# Patient Record
Sex: FEMALE | Race: OTHER
[De-identification: names, ages, dates, MRNs, and addresses within clinical notes are randomized per-mention and may not be internally consistent; named-entity substitution may affect disease eponyms.]

---

## 2018-03-18 ENCOUNTER — HOSPITAL ENCOUNTER (EMERGENCY)
Dept: HOSPITAL 62 - ER | Age: 23
LOS: 1 days | Discharge: HOME | End: 2018-03-19
Payer: SELF-PAY

## 2018-03-18 DIAGNOSIS — R11.10: Primary | ICD-10-CM

## 2018-03-18 DIAGNOSIS — Z90.49: ICD-10-CM

## 2018-03-18 DIAGNOSIS — R10.13: ICD-10-CM

## 2018-03-18 DIAGNOSIS — R10.11: ICD-10-CM

## 2018-03-18 PROCEDURE — 36415 COLL VENOUS BLD VENIPUNCTURE: CPT

## 2018-03-18 PROCEDURE — 96365 THER/PROPH/DIAG IV INF INIT: CPT

## 2018-03-18 PROCEDURE — 84703 CHORIONIC GONADOTROPIN ASSAY: CPT

## 2018-03-18 PROCEDURE — 80053 COMPREHEN METABOLIC PANEL: CPT

## 2018-03-18 PROCEDURE — 99284 EMERGENCY DEPT VISIT MOD MDM: CPT

## 2018-03-18 PROCEDURE — 96376 TX/PRO/DX INJ SAME DRUG ADON: CPT

## 2018-03-18 PROCEDURE — 87088 URINE BACTERIA CULTURE: CPT

## 2018-03-18 PROCEDURE — 74177 CT ABD & PELVIS W/CONTRAST: CPT

## 2018-03-18 PROCEDURE — 96361 HYDRATE IV INFUSION ADD-ON: CPT

## 2018-03-18 PROCEDURE — 87086 URINE CULTURE/COLONY COUNT: CPT

## 2018-03-18 PROCEDURE — 76705 ECHO EXAM OF ABDOMEN: CPT

## 2018-03-18 PROCEDURE — 96375 TX/PRO/DX INJ NEW DRUG ADDON: CPT

## 2018-03-18 PROCEDURE — 87186 SC STD MICRODIL/AGAR DIL: CPT

## 2018-03-18 PROCEDURE — 85025 COMPLETE CBC W/AUTO DIFF WBC: CPT

## 2018-03-18 PROCEDURE — 81001 URINALYSIS AUTO W/SCOPE: CPT

## 2018-03-18 PROCEDURE — 83690 ASSAY OF LIPASE: CPT

## 2018-03-19 VITALS — DIASTOLIC BLOOD PRESSURE: 75 MMHG | SYSTOLIC BLOOD PRESSURE: 121 MMHG

## 2018-03-19 LAB
ADD MANUAL DIFF: NO
ALBUMIN SERPL-MCNC: 4.1 G/DL (ref 3.5–5)
ALP SERPL-CCNC: 85 U/L (ref 38–126)
ALT SERPL-CCNC: 27 U/L (ref 9–52)
ANION GAP SERPL CALC-SCNC: 11 MMOL/L (ref 5–19)
APPEARANCE UR: (no result)
APTT PPP: (no result) S
AST SERPL-CCNC: 15 U/L (ref 14–36)
BASOPHILS # BLD AUTO: 0 10^3/UL (ref 0–0.2)
BASOPHILS NFR BLD AUTO: 0.3 % (ref 0–2)
BILIRUB DIRECT SERPL-MCNC: 0.3 MG/DL (ref 0–0.4)
BILIRUB SERPL-MCNC: 0.3 MG/DL (ref 0.2–1.3)
BILIRUB UR QL STRIP: (no result)
BUN SERPL-MCNC: 10 MG/DL (ref 7–20)
CALCIUM: 9.6 MG/DL (ref 8.4–10.2)
CHLORIDE SERPL-SCNC: 109 MMOL/L (ref 98–107)
CO2 SERPL-SCNC: 24 MMOL/L (ref 22–30)
EOSINOPHIL # BLD AUTO: 0 10^3/UL (ref 0–0.6)
EOSINOPHIL NFR BLD AUTO: 0.2 % (ref 0–6)
ERYTHROCYTE [DISTWIDTH] IN BLOOD BY AUTOMATED COUNT: 12.6 % (ref 11.5–14)
GLUCOSE SERPL-MCNC: 102 MG/DL (ref 75–110)
GLUCOSE UR STRIP-MCNC: NEGATIVE MG/DL
HCT VFR BLD CALC: 41.5 % (ref 36–47)
HGB BLD-MCNC: 14.3 G/DL (ref 12–15.5)
KETONES UR STRIP-MCNC: 20 MG/DL
LIPASE SERPL-CCNC: 66 U/L (ref 23–300)
LYMPHOCYTES # BLD AUTO: 1.6 10^3/UL (ref 0.5–4.7)
LYMPHOCYTES NFR BLD AUTO: 11.2 % (ref 13–45)
MCH RBC QN AUTO: 29.8 PG (ref 27–33.4)
MCHC RBC AUTO-ENTMCNC: 34.4 G/DL (ref 32–36)
MCV RBC AUTO: 87 FL (ref 80–97)
MONOCYTES # BLD AUTO: 0.6 10^3/UL (ref 0.1–1.4)
MONOCYTES NFR BLD AUTO: 4.5 % (ref 3–13)
NEUTROPHILS # BLD AUTO: 11.9 10^3/UL (ref 1.7–8.2)
NEUTS SEG NFR BLD AUTO: 83.8 % (ref 42–78)
NITRITE UR QL STRIP: NEGATIVE
PH UR STRIP: 5 [PH] (ref 5–9)
PLATELET # BLD: 290 10^3/UL (ref 150–450)
POTASSIUM SERPL-SCNC: 4 MMOL/L (ref 3.6–5)
PROT SERPL-MCNC: 7 G/DL (ref 6.3–8.2)
PROT UR STRIP-MCNC: 100 MG/DL
RBC # BLD AUTO: 4.8 10^6/UL (ref 3.72–5.28)
SODIUM SERPL-SCNC: 143.7 MMOL/L (ref 137–145)
SP GR UR STRIP: 1.03
TOTAL CELLS COUNTED % (AUTO): 100 %
UROBILINOGEN UR-MCNC: 2 MG/DL (ref ?–2)
WBC # BLD AUTO: 14.2 10^3/UL (ref 4–10.5)

## 2018-03-19 RX ADMIN — MORPHINE SULFATE PRN MG: 10 INJECTION INTRAMUSCULAR; INTRAVENOUS; SUBCUTANEOUS at 02:45

## 2018-03-19 RX ADMIN — MORPHINE SULFATE PRN MG: 10 INJECTION INTRAMUSCULAR; INTRAVENOUS; SUBCUTANEOUS at 01:35

## 2018-03-19 RX ADMIN — MORPHINE SULFATE PRN MG: 10 INJECTION INTRAMUSCULAR; INTRAVENOUS; SUBCUTANEOUS at 04:20

## 2018-03-19 NOTE — RADIOLOGY REPORT (SQ)
EXAM DESCRIPTION: 



U/S ABDOMEN LIMITED W/O DOP



CLINICAL HISTORY: 



22 years, Female, ruq, epigastric pain



COMPARISON:

None.



LIMITATIONS:

None.



FINDINGS:



Gallbladder, negative sonographic Mccormick's test, hepatobiliary

ductal system including a 0.5 cm diameter common bile duct,

pancreas, aorta, liver, and 13 cm right kidney appear within

normal limits. No ascites.



IMPRESSION:



Normal RUQ-ultrasound.

## 2018-03-19 NOTE — RADIOLOGY REPORT (SQ)
EXAM DESCRIPTION:

CT ABD/PELVIS WITH IV ONLY



CLINICAL HISTORY:

22 years Female, ongoing abdominal pain, ruq unremarkable



COMPARISON:

None.



TECHNIQUE:

89 mL Isovue-370 IV contrast.  This exam was performed according

to our departmental dose-optimization program, which includes

automated exposure control, adjustment of the mA and/or kV

according to patient size and/or use of iterative reconstruction

technique. Three



FINDINGS:



Mild to mesenteric lymphadenopathy of the right lower abdominal

quadrant includes a 1.1 x 0.6 cm lymph node, image 55 of series

5.



0.5 cm uncomplicated renal stone bilaterally. Normal appendix.



Inferior thorax, liver, gallbladder, pancreas, spleen, adrenals,

renal system, gastrointestinal tract, pelvic organs, lymphatics,

vasculature, and musculoskeleton appear otherwise unremarkable.  





IMPRESSION:



1. Mild mesenteric lymphadenitis of the right lower abdominal

quadrant.

2. Bilateral nephrolithiasis measures 0.5 cm each.